# Patient Record
Sex: FEMALE | Race: WHITE | NOT HISPANIC OR LATINO
[De-identification: names, ages, dates, MRNs, and addresses within clinical notes are randomized per-mention and may not be internally consistent; named-entity substitution may affect disease eponyms.]

---

## 2022-04-10 PROBLEM — Z00.00 ENCOUNTER FOR PREVENTIVE HEALTH EXAMINATION: Noted: 2022-04-10

## 2022-04-12 ENCOUNTER — APPOINTMENT (OUTPATIENT)
Dept: ORTHOPEDIC SURGERY | Facility: CLINIC | Age: 42
End: 2022-04-12
Payer: OTHER MISCELLANEOUS

## 2022-04-12 VITALS — BODY MASS INDEX: 34.15 KG/M2 | WEIGHT: 200 LBS | HEIGHT: 64 IN

## 2022-04-12 DIAGNOSIS — Z78.9 OTHER SPECIFIED HEALTH STATUS: ICD-10-CM

## 2022-04-12 DIAGNOSIS — S82.832D OTHER FRACTURE OF UPPER AND LOWER END OF LEFT FIBULA, SUBSEQUENT ENCOUNTER FOR CLOSED FRACTURE WITH ROUTINE HEALING: ICD-10-CM

## 2022-04-12 PROCEDURE — 99024 POSTOP FOLLOW-UP VISIT: CPT

## 2022-04-12 PROCEDURE — 73610 X-RAY EXAM OF ANKLE: CPT | Mod: LT

## 2022-04-12 NOTE — ASSESSMENT
[FreeTextEntry1] : 40 yo female presenting for f/u of left distal fibula avulsion fracture on 3/10/22. Patient reporting no pain.\par -LLE WBAT, may ween off and d/c CAM boot\par -Activities as tolerated, no restrictions at this time\par -Patient may return back to work full duty without restrictions, note given today\par -All questions answered\par -F/u PRN\par

## 2022-04-12 NOTE — WORK
[Can return to work without limitations on ______] : can return to work without limitations on [unfilled] [FreeTextEntry1] : good; there is no temporary impairment at this time.

## 2022-04-12 NOTE — PHYSICAL EXAM
[Left] : left foot and ankle [NL (40)] : plantar flexion 40 degrees [NL 30)] : inversion 30 degrees [NL (20)] : eversion 20 degrees [5___] : [facility] 5[unfilled]/5 [2+] : posterior tibialis pulse: 2+ [Normal] : saphenous nerve sensation normal [The fracture is in acceptable alignment. There is progression in healing seen] : The fracture is in acceptable alignment. There is progression in healing seen [] : no pain when stressing lateral tarsal metatarsal joint [de-identified] : LLE short CAM boot [FreeTextEntry9] : no further displacement on distal fibular avulsion fracture

## 2022-04-12 NOTE — HISTORY OF PRESENT ILLNESS
[Work related] : work related [Sudden] : sudden [0] : 0 [Full time] : Work status: full time [de-identified] : Patient is here for a fracture care visit. Patient is being treated for closed fracture of distal end of left fibula from 3/10/2022. Patient came in wearing CAM Boot. Patient states she has no pain and no complaints at this time.  [] : Post Surgical Visit: no [FreeTextEntry1] : Left ankle  [FreeTextEntry3] : 3/8/2022 [FreeTextEntry5] : Patient states she rolled her ankle and fell in a pothole in the parking\par lot at work. [de-identified] : 3/10/2022 [de-identified] : Dr. Fan  [de-identified] : X-ray  [de-identified] : None  [de-identified] :